# Patient Record
(demographics unavailable — no encounter records)

---

## 2024-12-19 NOTE — DISCUSSION/SUMMARY
[Patient] : the patient [FreeTextEntry4] : cerebrovascular disease [FreeTextEntry1] : Echo will be ordered  to evaluate aortic valve.  [EKG obtained to assist in diagnosis and management of assessed problem(s)] : EKG obtained to assist in diagnosis and management of assessed problem(s)

## 2024-12-19 NOTE — REASON FOR VISIT
[Symptom and Test Evaluation] : symptom and test evaluation [Structural Heart and Valve Disease] : structural heart and valve disease [Carotid, Aortic and Peripheral Vascular Disease] : carotid, aortic and peripheral vascular disease lester all pertinent systems normal

## 2024-12-19 NOTE — HISTORY OF PRESENT ILLNESS
[FreeTextEntry1] : 65 yo female presents for cardiac evaluation. She is concerned about cerebrovascular disease. Pt denies chest pain or shortness of breath. Pt denies exertional symptoms. Records were reviewed from [facility]. Endo visit recently. Pt has started Wegovy . Pt recently broke her right foot

## 2025-03-03 NOTE — DISCUSSION/SUMMARY
[Patient] : the patient [FreeTextEntry4] : cerebrovascular disease [FreeTextEntry1] : Echo reviewed. Sleep study ordered by PMD. Pt will continue weight loss. Pt will follow up in 6 months.  [EKG obtained to assist in diagnosis and management of assessed problem(s)] : EKG obtained to assist in diagnosis and management of assessed problem(s)

## 2025-03-03 NOTE — HISTORY OF PRESENT ILLNESS
[FreeTextEntry1] : 65 yo female presents for cardiac evaluation. She is concerned about cerebrovascular disease. Pt denies chest pain or shortness of breath. Pt denies exertional symptoms. Records were reviewed from [facility]. Endo visit recently. Pt has started Wegovy . Pt had Echo and results were discussed. Recent colonoscopy.

## 2025-03-03 NOTE — REASON FOR VISIT
[Symptom and Test Evaluation] : symptom and test evaluation [CV Risk Factors and Non-Cardiac Disease] : CV risk factors and non-cardiac disease [Structural Heart and Valve Disease] : structural heart and valve disease [Carotid, Aortic and Peripheral Vascular Disease] : carotid, aortic and peripheral vascular disease

## 2025-04-24 NOTE — DATA REVIEWED
[de-identified] : MRI brain 10/26/2020 (listed under [facility]ada[facility]) Minimal chronic small vessel ischemic changes. Old small left PCA territory infarct  MRA head and neck 12/4/2020: unremarkable  [de-identified] : Carotid ultrasound 1/12/24: 1. Right: proximal ICA 1-19% stenosis. 2. Right: CCA Mild plaque noted in the bulb. 3. Left: proximal ICA Normal. 4. Vertebral arteries: antegrade flow bilaterally.
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
## 2025-04-24 NOTE — PHYSICAL EXAM
[FreeTextEntry1] : Examination: Constitutional: normal, no apparent distress Eyes: normal conjunctiva b/l, no ptosis, visual fields full Respiratory: no respiratory distress, normal effort, normal auscultation Cardiovascular: normal rate, rhythm, no murmurs Neck: supple, no masses Vascular: carotids normal Skin: normal color, no rashes Psych: normal mood, affect  Neurological: Memory: normal memory, oriented to person, place, time Language intact/no aphasia Cranial Nerves: II-XII intact, Pupils equally round and reactive to light, ocular muscles/movements intact, no ptosis, no facial weakness, tongue protrudes normally in the midline,  Motor: normal tone, no pronator drift, full strength in all four extremities in the proximal and distal muscle groups Coordination: Fine motor movements intact, rapid alternating movements intact, finger to nose intact bilaterally Sensory: intact to light touch,  joint position sense, decreased vibration in feet DTRs: symmetric, trace in b/l triceps, trace in b/l biceps, trace in b/l brachioradialis, absent in bilateral patellars, absent in bilateral Achilles, Babinskis negative bilaterally Gait: narrow based, steady

## 2025-04-24 NOTE — DISCUSSION/SUMMARY
[FreeTextEntry1] : Ms. Sherman is a 65 year old woman with a history of a left PCA infarct found incidentally on imaging. She has no clinical history of stroke.  Stroke: -Chronic left PCA infarct -MRA head and neck was unremarkable in 2020. -Carotid ultrasound in 2024 did not show significant stenosis. -Echos have been done. -Continue daily aspirin, statin -Will order a home sleep study to evaluate for possible CAMRON which is a risk for stroke. -Repeat MRI brain to make sure there have not been any new strokes. If present will suggest arrhythmia monitoring. -Blood sugar control  Neuropathy: -There is suggestion of peripheral neuropathy (hypoactive reflexes on exam) -Will request records of EMG/NCV and blood tests from [facility]. If not previously done, will order additional blood tests for neuropathy workup.  f/u 9-12 months

## 2025-04-24 NOTE — CONSULT LETTER
[Dear  ___] : Dear  [unfilled], [Consult Letter:] : I had the pleasure of evaluating your patient, [unfilled]. [Please see my note below.] : Please see my note below. [Consult Closing:] : Thank you very much for allowing me to participate in the care of this patient.  If you have any questions, please do not hesitate to contact me. [FreeTextEntry2] : Lewis Romero [FreeTextEntry3] : Sincerely,   Kathia Wright MD Diplomate, [employer]iat[employer] Neu[employer] Board Certified in the Subspecialty of Clinical Neurophysiology Board Certified in the Subspecialty of Sleep Medicine Board Certified in the Subspecialty of Epilepsy

## 2025-06-03 NOTE — ASSESSMENT
[FreeTextEntry1] : 1) benign findings as above- education  2) acne papules- new, chronic, flaring, uncertain prog c/w BP/clinda; SED add tretinoin at bedtime; SED  3) inflamed EIC -incision and drainage using 11 blade and q tips; risk of incomplete treatment, scarring, and recurrence discussed  4) melasma/hyperpigmentation hold off on SML at this time to avoid irritation

## 2025-06-03 NOTE — PHYSICAL EXAM
[FreeTextEntry3] : AAOx3, pleasant, NAD, no visual lymphadenopathy hair, scalp, face, nose, eyelids, ears, lips, oropharynx, neck, chest, abdomen, back, right arm, left arm, nails, and hands examined with all normal findings, pertinent findings include:  multiple benign nevi and lentigines melasma cystic papule left axillae

## 2025-06-03 NOTE — HISTORY OF PRESENT ILLNESS
[FreeTextEntry1] : skin check [de-identified] : 65 year old. skin check. growth on left axillae. hyperpigmentation. bumps on nose.